# Patient Record
Sex: MALE | Race: WHITE | Employment: FULL TIME | URBAN - METROPOLITAN AREA
[De-identification: names, ages, dates, MRNs, and addresses within clinical notes are randomized per-mention and may not be internally consistent; named-entity substitution may affect disease eponyms.]

---

## 2018-10-09 ENCOUNTER — TELEPHONE (OUTPATIENT)
Dept: FAMILY MEDICINE | Facility: OTHER | Age: 28
End: 2018-10-09

## 2018-10-09 NOTE — TELEPHONE ENCOUNTER
"Bismark presented to the clinic as a walk in today, wanting to see someone for his back pain.  Sudden onset while at work this morning around 0730; \"I wasn't doing any heavy lifting or anything weird, was just shifting some things around in the bed of the work truck, and had sudden pain.\"  The pain is located in the middle of the left side of his back, wrapping around to the front of the chest/lower ribs. Pain rates 6/10 and is worse with deep breathing and certain movements. He took 2 Aleve around 0800 and the pain has only become worse. He now feels like his neck is becoming stiff from the limited movement.   Current /94, pulse 71, resps 18  Alert, oriented x3 and answering questions appropriately.  Denies dizziness or loss of B/B control.  Denies any weakness/numbness/tingling in extremities or difficulty walking.    Checked with providers in clinic, no openings at this time and no one is able to work him in immediately. Huddled with Dr. OCTAVIO Souza, offered other openings later today/tomorrow. He declines, would like to be seen now, so will go to Urgent Care. Declines BP recheck.    Rosales Hernandez, RN, BSN    "

## 2018-11-15 NOTE — PROGRESS NOTES
"  SUBJECTIVE:   Bismark Youngblood is a 28 year old male who presents to clinic today for the following health issues: Patient c/o severe fatigue during the day and wife states that he snores at night. Would like a referral for a sleep study.       HPI  Severe Fatigue and Snoring     - has had issues sleeping since he was young.   - \"doesn't sleep well\"  - Wakes up frequently through night but falls asleep easily.   - Wakes up at least 4 times per night, doesn't get out of bed, able to fall back asleep immediately.   - Does not take any OTC or medications for sleep  - Wife notes he is snoring.   - He will wake up and throat hurts, and then knows he was snoring.   - Wife doesn't notice any absence of breathing.   - Goes to bed: 8:30 AM;  Wakes up at: 5 AM  - Doesn't feel rested when waking up, could take a nap any time during the day, has daily headaches.   - Falling asleep on way to work and at home.   - Father also has Sleep Apnea.    - Headaches are dull ache, all over head, more in morning and they go away.  They have been a very long standing issue.    - Caffeine - drinks coffee in morning 2 cups.   - Used to drink energy drinks but didn't help, hasn't been doing that.   - Denies recreational drug use, no alcohol use.  Used to chew but quit and no smoking history.       Problem list and histories reviewed & adjusted, as indicated.  Additional history: as documented    There is no problem list on file for this patient.    Past Surgical History:   Procedure Laterality Date     NO HISTORY OF SURGERY         Social History   Substance Use Topics     Smoking status: Never Smoker     Smokeless tobacco: Former User     Alcohol use No     Family History   Problem Relation Age of Onset     No Known Problems Mother      Sleep Apnea Father      No Known Problems Sister      No Known Problems Sister      Breast Cancer Paternal Aunt          No current outpatient prescriptions on file.       ROS:  Constitutional, HEENT, " cardiovascular, pulmonary, GI, , musculoskeletal, neuro, skin, endocrine and psych systems are negative, except as otherwise noted.    OBJECTIVE:     /80 (BP Location: Left arm, Patient Position: Chair, Cuff Size: Adult Large)  Pulse 88  Temp 98.3  F (36.8  C) (Temporal)  Resp 16  Wt 247 lb (112 kg)  SpO2 99%  There is no height or weight on file to calculate BMI.  GENERAL: healthy, alert and no distress  RESP: lungs clear to auscultation - no rales, rhonchi or wheezes  CV: regular rate and rhythm, normal S1 S2, no S3 or S4, no murmur, click or rub, no peripheral edema and peripheral pulses strong  MS: no gross musculoskeletal defects noted, no edema  NEURO: Normal strength and tone, mentation intact and speech normal  PSYCH: mentation appears normal, affect normal/bright    Diagnostic Test Results:  none     ASSESSMENT/PLAN:       ICD-10-CM    1. Daytime somnolence R40.0 SLEEP EVALUATION & MANAGEMENT REFERRAL - [facility] [phone] (Age 12 if over 100 lbs)   2. Screening for diabetes mellitus Z13.1 Glucose   3. Screening cholesterol level Z13.220 Lipid panel reflex to direct LDL Fasting       Has multiple symptoms consistent with sleep apnea, recommend sleep study for evaluation.  If negative will require more work-up, if positive will treat appropriately.  Did order fasting labs to assess for chronic conditions, he will come in when fasting to have them done.     Follow-up pending results of the sleep study.     Options for treatment and follow-up care were reviewed with the patient and/or guardian. Patient and/or guardian engaged in the decision making process and verbalized understanding of the options discussed and agreed with the final plan.     Ramana Gallego PA-C  [facility]

## 2018-11-19 ENCOUNTER — OFFICE VISIT (OUTPATIENT)
Dept: FAMILY MEDICINE | Facility: OTHER | Age: 28
End: 2018-11-19
Payer: COMMERCIAL

## 2018-11-19 VITALS
SYSTOLIC BLOOD PRESSURE: 128 MMHG | WEIGHT: 247 LBS | HEART RATE: 88 BPM | DIASTOLIC BLOOD PRESSURE: 80 MMHG | TEMPERATURE: 98.3 F | RESPIRATION RATE: 16 BRPM | OXYGEN SATURATION: 99 %

## 2018-11-19 DIAGNOSIS — Z13.220 SCREENING CHOLESTEROL LEVEL: ICD-10-CM

## 2018-11-19 DIAGNOSIS — Z23 NEED FOR PROPHYLACTIC VACCINATION AND INOCULATION AGAINST INFLUENZA: ICD-10-CM

## 2018-11-19 DIAGNOSIS — Z13.1 SCREENING FOR DIABETES MELLITUS: ICD-10-CM

## 2018-11-19 DIAGNOSIS — R40.0 DAYTIME SOMNOLENCE: Primary | ICD-10-CM

## 2018-11-19 PROCEDURE — 90471 IMMUNIZATION ADMIN: CPT | Performed by: PHYSICIAN ASSISTANT

## 2018-11-19 PROCEDURE — 90686 IIV4 VACC NO PRSV 0.5 ML IM: CPT | Performed by: PHYSICIAN ASSISTANT

## 2018-11-19 PROCEDURE — 99213 OFFICE O/P EST LOW 20 MIN: CPT | Mod: 25 | Performed by: PHYSICIAN ASSISTANT

## 2018-11-19 NOTE — PROGRESS NOTES
Prior to injection verified patient identity using patient's name and date of birth.  Due to injection administration, patient instructed to remain in clinic for 15 minutes  afterwards, and to report any adverse reaction to me immediately.    Injectable Influenza Immunization Documentation    1.  Is the person to be vaccinated sick today?   No    2. Does the person to be vaccinated have an allergy to a component   of the vaccine?   No  Egg Allergy Algorithm Link    3. Has the person to be vaccinated ever had a serious reaction   to influenza vaccine in the past?   No    4. Has the person to be vaccinated ever had Guillain-Barré syndrome?   No    Form completed by Nicole Arroyo CMA (AAMA)

## 2018-11-19 NOTE — MR AVS SNAPSHOT
After Visit Summary   11/19/2018    Bismark Youngblood    MRN: [id number]           Patient Information     Date Of Birth          1990        Visit Information        Provider Department      11/19/2018 5:10 PM Ramana Gallego PA-C [facility]        Today's Diagnoses     Daytime somnolence    -  1    Screening for diabetes mellitus        Screening cholesterol level           Follow-ups after your visit        Additional Services     SLEEP EVALUATION & MANAGEMENT REFERRAL - [facility] [phone] (Age 12 if over 100 lbs)       Please be aware that coverage of these services is subject to the terms and limitations of your health insurance plan.  Call member services at your health plan with any benefit or coverage questions.      Please bring the following to your appointment:    >>   List of current medications   >>   This referral request   >>   Any documents/labs given to you for this referral                      Future tests that were ordered for you today     Open Future Orders        Priority Expected Expires Ordered    Lipid panel reflex to direct LDL Fasting Routine 5/18/2019 6/17/2019 11/19/2018    Glucose Routine  11/19/2019 11/19/2018    SLEEP EVALUATION & MANAGEMENT REFERRAL - [facility] [phone] (Age 13 if over 100 lbs) Routine  11/19/2019 11/19/2018            Who to contact     If you have questions or need follow up information about today's clinic visit or your schedule please contact [facility] directly at [phone].  Normal or non-critical lab and imaging results will be communicated to you by MyChart, letter or phone within 4 business days after the clinic has received the results. If you do not hear from us within 7 days, please contact the clinic through MyChart or phone. If you have a critical or abnormal lab result, we will notify you by phone as soon as possible.  Submit  refill requests through [employer] or call your pharmacy and they will forward the refill request to us. Please allow 3 business days for your refill to be completed.          Additional Information About Your Visit        Care EveryWhere ID     This is your Care EveryWhere ID. This could be used by other organizations to access your [place] medical records  [id number]        Your Vitals Were     Pulse Temperature Respirations Pulse Oximetry          88 98.3  F (36.8  C) (Temporal) 16 99%         Blood Pressure from Last 3 Encounters:   11/19/18 128/80    Weight from Last 3 Encounters:   11/19/18 247 lb (112 kg)               Primary Care Provider Fax #    Physician No Ref-Primary [phone]       No address on file        Equal Access to Services     EZE WINKLER : Joseph Crump, wasoren avitia, андрей kaalmada rocky, hema stevenson . So [facility] [phone].    ATENCIÓN: Si habla español, tiene a haddad disposición servicios gratuitos de asistencia lingüística. Llame al [phone].    We comply with applicable federal civil rights laws and [place] laws. We do not discriminate on the basis of race, color, national origin, age, disability, sex, sexual orientation, or gender identity.            Thank you!     Thank you for choosing [facility]  for your care. Our goal is always to provide you with excellent care. Hearing back from our patients is one way we can continue to improve our services. Please take a few minutes to complete the written survey that you may receive in the mail after your visit with us. Thank you!             Your Updated Medication List - Protect others around you: Learn how to safely use, store and throw away your medicines at www.disposemymeds.org.      Notice  As of 11/19/2018  5:26 PM    You have not been prescribed any medications.

## 2018-11-27 ENCOUNTER — TELEPHONE (OUTPATIENT)
Dept: FAMILY MEDICINE | Facility: OTHER | Age: 28
End: 2018-11-27

## 2018-11-27 DIAGNOSIS — R40.0 DAYTIME SOMNOLENCE: Primary | ICD-10-CM

## 2018-11-27 NOTE — TELEPHONE ENCOUNTER
Reason for Call:  Other call back    Detailed comments: patient is calling he is not able to get in for sleep study until February wondering what he should do.  Please call    Phone Number Patient can be reached at: Home number on file [phone] (home)    Best Time: any    Can we leave a detailed message on this number? YES    Call taken on 11/27/2018 at 4:12 PM by Katie Ham

## 2018-11-28 NOTE — TELEPHONE ENCOUNTER
Please call.     Can place an order for Home sleep study, but these are only good to pick upon obstructive sleep apnea, and cannot pick-up on other causes for sleep disorder.  And they still aren't as good as having a formal sleep study.     Ramana Gallego PA-C

## 2018-11-28 NOTE — TELEPHONE ENCOUNTER
Spoke to patient and informed him that order had been placed for home study sleep study but still would need to have a consult with the sleep Dr. He stated that he will think about it and let us know if he still wants to do it this year.

## 2018-11-28 NOTE — TELEPHONE ENCOUNTER
Can anyone help to see if there is any location that has earlier appointments for patient.     Ramana Gallego PA-C

## 2018-11-28 NOTE — TELEPHONE ENCOUNTER
Okay- spoke to patient and informed him of message below. And he is okay with that, he would like to start there first with the home sleep study. If you then can place orders.

## 2018-11-28 NOTE — TELEPHONE ENCOUNTER
I called [facility] [place] [facility], Patient does not want to go to any of those locations. He is wondering if he can do a Home sleep study referral?

## 2019-06-12 ENCOUNTER — NURSE TRIAGE (OUTPATIENT)
Dept: FAMILY MEDICINE | Facility: OTHER | Age: 29
End: 2019-06-12

## 2019-06-12 ENCOUNTER — HOSPITAL ENCOUNTER (EMERGENCY)
Facility: CLINIC | Age: 29
Discharge: HOME | End: 2019-06-12
Attending: FAMILY MEDICINE | Admitting: FAMILY MEDICINE
Payer: COMMERCIAL

## 2019-06-12 VITALS
WEIGHT: 230 LBS | RESPIRATION RATE: 16 BRPM | OXYGEN SATURATION: 98 % | DIASTOLIC BLOOD PRESSURE: 91 MMHG | TEMPERATURE: 98.6 F | HEART RATE: 60 BPM | SYSTOLIC BLOOD PRESSURE: 124 MMHG

## 2019-06-12 DIAGNOSIS — R19.7 DIARRHEA OF PRESUMED INFECTIOUS ORIGIN: ICD-10-CM

## 2019-06-12 DIAGNOSIS — R55 POSTURAL DIZZINESS WITH NEAR SYNCOPE: ICD-10-CM

## 2019-06-12 DIAGNOSIS — R25.2 LEG CRAMPING: ICD-10-CM

## 2019-06-12 DIAGNOSIS — R42 POSTURAL DIZZINESS WITH NEAR SYNCOPE: ICD-10-CM

## 2019-06-12 DIAGNOSIS — R11.2 NAUSEA AND VOMITING, INTRACTABILITY OF VOMITING NOT SPECIFIED, UNSPECIFIED VOMITING TYPE: ICD-10-CM

## 2019-06-12 DIAGNOSIS — H53.10 SUBJECTIVE VISUAL DISTURBANCE OF BOTH EYES: ICD-10-CM

## 2019-06-12 DIAGNOSIS — K58.0 IRRITABLE BOWEL SYNDROME WITH DIARRHEA: ICD-10-CM

## 2019-06-12 LAB
ALBUMIN SERPL-MCNC: 4 G/DL (ref 3.4–5)
ALBUMIN UR-MCNC: NEGATIVE MG/DL
ALP SERPL-CCNC: 75 U/L (ref 40–150)
ALT SERPL W P-5'-P-CCNC: 38 U/L (ref 0–70)
ANION GAP SERPL CALCULATED.3IONS-SCNC: 4 MMOL/L (ref 3–14)
APPEARANCE UR: CLEAR
AST SERPL W P-5'-P-CCNC: 17 U/L (ref 0–45)
BASOPHILS # BLD AUTO: 0.1 10E9/L (ref 0–0.2)
BASOPHILS NFR BLD AUTO: 0.6 %
BILIRUB SERPL-MCNC: 0.3 MG/DL (ref 0.2–1.3)
BILIRUB UR QL STRIP: NEGATIVE
BUN SERPL-MCNC: 13 MG/DL (ref 7–30)
CALCIUM SERPL-MCNC: 8.7 MG/DL (ref 8.5–10.1)
CHLORIDE SERPL-SCNC: 107 MMOL/L (ref 94–109)
CO2 SERPL-SCNC: 28 MMOL/L (ref 20–32)
COLOR UR AUTO: YELLOW
CREAT SERPL-MCNC: 0.96 MG/DL (ref 0.66–1.25)
CRP SERPL-MCNC: <2.9 MG/L (ref 0–8)
DIFFERENTIAL METHOD BLD: ABNORMAL
EOSINOPHIL NFR BLD AUTO: 4.6 %
ERYTHROCYTE [DISTWIDTH] IN BLOOD BY AUTOMATED COUNT: 13.7 % (ref 10–15)
GFR SERPL CREATININE-BSD FRML MDRD: >90 ML/MIN/{1.73_M2}
GLUCOSE SERPL-MCNC: 78 MG/DL (ref 70–99)
GLUCOSE UR STRIP-MCNC: NEGATIVE MG/DL
HBA1C MFR BLD: 5.2 % (ref 0–5.6)
HCT VFR BLD AUTO: 44.4 % (ref 40–53)
HGB BLD-MCNC: 14.6 G/DL (ref 13.3–17.7)
HGB UR QL STRIP: NEGATIVE
IMM GRANULOCYTES # BLD: 0 10E9/L (ref 0–0.4)
IMM GRANULOCYTES NFR BLD: 0.5 %
KETONES UR STRIP-MCNC: NEGATIVE MG/DL
LEUKOCYTE ESTERASE UR QL STRIP: NEGATIVE
LIPASE SERPL-CCNC: 91 U/L (ref 73–393)
LYMPHOCYTES # BLD AUTO: 2.7 10E9/L (ref 0.8–5.3)
LYMPHOCYTES NFR BLD AUTO: 34.4 %
MCH RBC QN AUTO: 25.7 PG (ref 26.5–33)
MCHC RBC AUTO-ENTMCNC: 32.9 G/DL (ref 31.5–36.5)
MCV RBC AUTO: 78 FL (ref 78–100)
MONOCYTES # BLD AUTO: 0.7 10E9/L (ref 0–1.3)
MONOCYTES NFR BLD AUTO: 8.8 %
NEUTROPHILS # BLD AUTO: 4.1 10E9/L (ref 1.6–8.3)
NEUTROPHILS NFR BLD AUTO: 51.1 %
NITRATE UR QL: NEGATIVE
NRBC # BLD AUTO: 0 10*3/UL
NRBC BLD AUTO-RTO: 0 /100
PH UR STRIP: 6 PH (ref 5–7)
PLATELET # BLD AUTO: 264 10E9/L (ref 150–450)
POTASSIUM SERPL-SCNC: 4.3 MMOL/L (ref 3.4–5.3)
PROT SERPL-MCNC: 7.4 G/DL (ref 6.8–8.8)
RBC # BLD AUTO: 5.69 10E12/L (ref 4.4–5.9)
SODIUM SERPL-SCNC: 139 MMOL/L (ref 133–144)
SOURCE: NORMAL
SP GR UR STRIP: 1.02 (ref 1–1.03)
TROPONIN I SERPL-MCNC: <0.015 UG/L (ref 0–0.04)
TSH SERPL DL<=0.005 MIU/L-ACNC: 1 MU/L (ref 0.4–4)
UROBILINOGEN UR STRIP-MCNC: 0 MG/DL (ref 0–2)
WBC # BLD AUTO: 8 10E9/L (ref 4–11)

## 2019-06-12 PROCEDURE — 96360 HYDRATION IV INFUSION INIT: CPT | Performed by: FAMILY MEDICINE

## 2019-06-12 PROCEDURE — 99284 EMERGENCY DEPT VISIT MOD MDM: CPT | Mod: 25 | Performed by: FAMILY MEDICINE

## 2019-06-12 PROCEDURE — 93010 ELECTROCARDIOGRAM REPORT: CPT | Mod: Z6 | Performed by: FAMILY MEDICINE

## 2019-06-12 PROCEDURE — 84484 ASSAY OF TROPONIN QUANT: CPT | Performed by: FAMILY MEDICINE

## 2019-06-12 PROCEDURE — 86140 C-REACTIVE PROTEIN: CPT | Performed by: FAMILY MEDICINE

## 2019-06-12 PROCEDURE — 93005 ELECTROCARDIOGRAM TRACING: CPT | Performed by: FAMILY MEDICINE

## 2019-06-12 PROCEDURE — 81003 URINALYSIS AUTO W/O SCOPE: CPT | Performed by: FAMILY MEDICINE

## 2019-06-12 PROCEDURE — 84443 ASSAY THYROID STIM HORMONE: CPT | Performed by: FAMILY MEDICINE

## 2019-06-12 PROCEDURE — 85025 COMPLETE CBC W/AUTO DIFF WBC: CPT | Performed by: FAMILY MEDICINE

## 2019-06-12 PROCEDURE — 80053 COMPREHEN METABOLIC PANEL: CPT | Performed by: FAMILY MEDICINE

## 2019-06-12 PROCEDURE — 83036 HEMOGLOBIN GLYCOSYLATED A1C: CPT | Performed by: FAMILY MEDICINE

## 2019-06-12 PROCEDURE — 83690 ASSAY OF LIPASE: CPT | Performed by: FAMILY MEDICINE

## 2019-06-12 PROCEDURE — 96361 HYDRATE IV INFUSION ADD-ON: CPT | Performed by: FAMILY MEDICINE

## 2019-06-12 PROCEDURE — 25000128 H RX IP 250 OP 636: Performed by: FAMILY MEDICINE

## 2019-06-12 RX ORDER — SODIUM CHLORIDE 9 MG/ML
INJECTION, SOLUTION INTRAVENOUS CONTINUOUS
Status: DISCONTINUED | OUTPATIENT
Start: 2019-06-12 | End: 2019-06-12 | Stop reason: HOSPADM

## 2019-06-12 RX ADMIN — SODIUM CHLORIDE 1000 ML: 9 INJECTION, SOLUTION INTRAVENOUS at 10:22

## 2019-06-12 RX ADMIN — SODIUM CHLORIDE 1000 ML: 9 INJECTION, SOLUTION INTRAVENOUS at 09:38

## 2019-06-12 ASSESSMENT — ENCOUNTER SYMPTOMS
ALLERGIC/IMMUNOLOGIC NEGATIVE: 1
WHEEZING: 0
NERVOUS/ANXIOUS: 1
NAUSEA: 1
DIAPHORESIS: 0
SEIZURES: 0
APPETITE CHANGE: 1
SHORTNESS OF BREATH: 0
HEMATOLOGIC/LYMPHATIC NEGATIVE: 1
CHILLS: 1
ENDOCRINE NEGATIVE: 1
ACTIVITY CHANGE: 1
PALPITATIONS: 0
DIZZINESS: 1
TREMORS: 0
FEVER: 0
COUGH: 0
VOMITING: 1
STRIDOR: 0
FATIGUE: 1
ABDOMINAL PAIN: 0
DIARRHEA: 1
BACK PAIN: 1
HEADACHES: 0
SPEECH DIFFICULTY: 0
WEAKNESS: 0
LIGHT-HEADEDNESS: 1
CHEST TIGHTNESS: 1

## 2019-06-12 NOTE — ED NOTES
Apple juice, water, and crackers given to patient and encouraged to eat and drink to see if he tolerates PO.  Did offer sandwhich if he wanted more than crackers.  Pt will let writer know.
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
## 2019-06-12 NOTE — LETTER
[facility]  Emergency Room  [address] [place]   [address]  Tel: [phone]   Fax: [phone]  2019    Bismark Youngblood  [address] [place] [id number]  [phone] (home)     : 1990          To Whom it May Concern:    Bismark Youngblood was seen in our ER today 2019. I expect his condition to improve over the next 2-3 days.  He may return to work/school, without restriction, when improved. If not improved during the above expected time period,  then I have encouraged him to be rechecked in his clinic for further evaluation.      Please contact me for questions or concerns.    Sincerely,      Carlitos Willis  Physician  [facility] Emergency Room
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
## 2019-06-12 NOTE — ED NOTES
After insertion of IV, pt became rigid, eyes fluttering for about 4 seconds than pt went limp and unresponsive for about 10 seconds.  After awakening pt became diaphoretic, soaking gown and sheets, c/o muscle pain in legs

## 2019-06-12 NOTE — DISCHARGE INSTRUCTIONS
Please read and follow the handout(s) instructions. Return, if needed, for increased or worsening symptoms and as directed by the handout(s).    Increase your fluid intake. Drinking small amounts often is the best way to take in more fluids when you are feeling nauseated.    See the note for work.    I would expect you to be improved in the next 2-3 days.    I hope you feel better soon!    Electronically signed, Carlitos Willis DO

## 2019-06-12 NOTE — ED AVS SNAPSHOT
[facility] Emergency Department  [address] [place] [address]  Phone:  [phone]  Fax:  [phone]                                    Bismark Youngblood   MRN: [id number]    Department:  [facility] Emergency Department   Date of Visit:  6/12/2019           After Visit Summary Signature Page    I have received my discharge instructions, and my questions have been answered. I have discussed any challenges I see with this plan with the nurse or doctor.    ..........................................................................................................................................  Patient/Patient Representative Signature      ..........................................................................................................................................  Patient Representative Print Name and Relationship to Patient    ..................................................               ................................................  Date                                   Time    ..........................................................................................................................................  Reviewed by Signature/Title    ...................................................              ..............................................  Date                                               Time          22EPIC Rev 08/18

## 2019-09-12 ENCOUNTER — TELEPHONE (OUTPATIENT)
Dept: FAMILY MEDICINE | Facility: OTHER | Age: 29
End: 2019-09-12

## 2019-09-12 NOTE — TELEPHONE ENCOUNTER
Panel Management Review      Patient has the following on his problem list: None      Composite cancer screening  Chart review shows that this patient is due/due soon for the following None  Summary:    Patient is due/failing the following:   LDL and PHYSICAL    Action needed:   Patient needs office visit for Physical  Patient needs fasting lab only appointment    Type of outreach:    Phone, spoke to patient.  Patient has switched to [facility] in  for current care.    Questions for provider review:    None                                                                                                                                    Kajal Reyes CMA ([facility])       Chart routed to Care Team .

## 2020-03-11 ENCOUNTER — HEALTH MAINTENANCE LETTER (OUTPATIENT)
Age: 30
End: 2020-03-11

## 2020-03-22 ENCOUNTER — VIRTUAL VISIT (OUTPATIENT)
Dept: FAMILY MEDICINE | Facility: OTHER | Age: 30
End: 2020-03-22

## 2020-03-22 NOTE — PROGRESS NOTES
"Date: 2020 16:02:51  Clinician: Rosales Meehan  Clinician NPI: [id number]  Patient: Bismark Youngblood  Patient : 1990  Patient Address: [address] [place] [address]  Patient Phone: [phone]  Visit Protocol: URI  Patient Summary:  Bismark is a 29 year old ( : 1990 ) male who initiated a Visit for cold, sinus infection, or influenza. When asked the question \"Please sign me up to receive news, health information and promotions from [employer]..\", Bismark responded \"No\".    Bismark states his symptoms started 1-2 days ago.   His symptoms consist of myalgia, a sore throat, a cough, nasal congestion, malaise, chills, and a headache. He is experiencing mild difficulty breathing with activities but can speak normally in full sentences. Bismark also feels feverish.   Symptom details     Nasal secretions: The color of his mucus is clear.    Cough: Bismark coughs a few times an hour and his cough is not more bothersome at night. Phlegm does not come into his throat when he coughs. He does not believe his cough is caused by post-nasal drip.     Sore throat: Bismark reports having moderate throat pain (4-6 on a 10 point pain scale), does not have exudate on his tonsils, and can swallow liquids. He is not sure if the lymph nodes in his neck are enlarged. A rash has not appeared on the skin since the sore throat started.     Temperature: His current temperature is 99.8 degrees Fahrenheit.     Headache: He states the headache is mild (1-3 on a 10 point pain scale).      Bismark denies having ear pain, rhinitis, facial pain or pressure, wheezing, and teeth pain. He also denies having a sinus infection within the past year, taking antibiotic medication for the symptoms, and having recent facial or sinus surgery in the past 60 days.   Precipitating events  Within the past week, Bismark has not been exposed to someone with strep throat. He has not recently been exposed to someone with influenza. Bismark has been in close " contact with the following high risk individuals: children under the age of 5.   Pertinent COVID-19 (Coronavirus) information  Bismark has not traveled internationally or to the areas where COVID-19 (Coronavirus) is widespread, including cruise ship travel in the last 14 days before the start of his symptoms.   Bismark has not had a close contact with a laboratory-confirmed COVID-19 patient within 14 days of symptom onset. He also has not had a close contact with a suspected COVID-19 patient within 14 days of symptom onset.   Bismark is not a healthcare worker and does not work in a healthcare facility.   Pertinent medical history  Bismark needs a return to work/school note.   Weight: 235 lbs   Bismark does not smoke or use smokeless tobacco.   Additional information as reported by the patient (free text): Diarrhea, and tight chest hard time breathing in cold weather and heart burn frequently in the last few days   Weight: 235 lbs    MEDICATIONS: No current medications, ALLERGIES: Penicillins  Clinician Response:  Dear Bismark,   Based on the information you have provided, you do have symptoms that are consistent with Coronavirus (COVID-19).   The coronavirus causes mild to severe respiratory illness with the most common symptoms including fever, cough and difficulty breathing. Unfortunately, many viruses cause similar symptoms and it can be difficult to distinguish between viruses, especially in mild cases, so we are presuming that anyone with cough or fever has coronavirus at this time.  Coronavirus/COVID-19 has reached the point of community spread in [place], meaning that we are finding the virus in people with no known exposure risk for ron the virus. Given the increasing commonness of coronavirus in the community we are no longer testing patients who are not critically ill.  If you are a health care worker, you should refer to your employee health office for instructions about testing and returning to work.  For  everyone else who has cough or fever, you should assume you are infected with coronavirus. Since you will not be tested but have symptoms that may be consistent with coronavirus, the CDC recommends you stay in self-isolation until these three things have happened:    You have had no fever for at least 72 hours (that is three full days of no fever without the use of medicine that reduces fevers)    AND   Other symptoms have improved (for example, when your cough or shortness of breath have improved)   AND   At least 7 days have passed since your symptoms first appeared.   How to Isolate:    Isolate yourself at home.   Do Not allow any visitors  Do Not go to work or school  Do Not go to [facility],  centers, shopping, or other public places.  Do Not shake hands.  Avoid close contact with others (hugging, kissing).   Protect Others:    Cover Your Mouth and Nose with a mask, disposable tissue or wash cloth to avoid spreading germs to others.  Wash your hands and face frequently with soap and water.   Managing Symptoms:    At this time, we primarily recommend Tylenol (Acetaminophen) for fever or pain. If you have liver or kidney problems, contact your primary care provider for instructions on use of tylenol. Adults can take 650 mg (two 325 mg pills) by mouth every 4-6 hours as needed OR 1,000 mg (two 500 mg pills) every 8 hours as needed. MAXIMUM DAILY DOSE: 3,000mg. For children, refer to dosing on bottle based on age or weight.   If you develop significant shortness of breath that prevents you from doing normal activities, please call 911 or proceed to the nearest emergency room and alert them immediately that you have been in self-isolation for possible coronavirus.   For more information about COVID19 and options for caring for yourself at home, please visit the CDC website at https://www.cdc.gov/coronavirus/2019-ncov/about/steps-when-sick.htmlFor more options for care at [facility] [place] [facility], please visit our  website at [email or web address]     Diagnosis: Cough  Diagnosis ICD: R05  Prescription: benzonatate 200 mg oral capsule 30 capsule, 0 days supply. Take 1 capsule by mouth 3 times per day as needed. Refills: 0, Refill as needed: no, Allow substitutions: yes

## 2021-01-03 ENCOUNTER — HEALTH MAINTENANCE LETTER (OUTPATIENT)
Age: 31
End: 2021-01-03

## 2021-04-25 ENCOUNTER — HEALTH MAINTENANCE LETTER (OUTPATIENT)
Age: 31
End: 2021-04-25

## 2021-10-10 ENCOUNTER — HEALTH MAINTENANCE LETTER (OUTPATIENT)
Age: 31
End: 2021-10-10

## 2022-05-21 ENCOUNTER — HEALTH MAINTENANCE LETTER (OUTPATIENT)
Age: 32
End: 2022-05-21

## 2022-09-18 ENCOUNTER — HEALTH MAINTENANCE LETTER (OUTPATIENT)
Age: 32
End: 2022-09-18

## 2023-06-04 ENCOUNTER — HEALTH MAINTENANCE LETTER (OUTPATIENT)
Age: 33
End: 2023-06-04